# Patient Record
Sex: MALE | Race: WHITE | Employment: UNEMPLOYED | URBAN - METROPOLITAN AREA
[De-identification: names, ages, dates, MRNs, and addresses within clinical notes are randomized per-mention and may not be internally consistent; named-entity substitution may affect disease eponyms.]

---

## 2017-02-17 ENCOUNTER — OFFICE VISIT (OUTPATIENT)
Dept: FAMILY MEDICINE CLINIC | Facility: CLINIC | Age: 52
End: 2017-02-17

## 2017-02-17 VITALS
SYSTOLIC BLOOD PRESSURE: 128 MMHG | BODY MASS INDEX: 29.95 KG/M2 | HEIGHT: 73 IN | HEART RATE: 96 BPM | RESPIRATION RATE: 16 BRPM | WEIGHT: 226 LBS | DIASTOLIC BLOOD PRESSURE: 86 MMHG | TEMPERATURE: 99 F | OXYGEN SATURATION: 97 %

## 2017-02-17 DIAGNOSIS — F10.11 ALCOHOL ABUSE, IN REMISSION: ICD-10-CM

## 2017-02-17 DIAGNOSIS — L40.9 PSORIASIS: ICD-10-CM

## 2017-02-17 DIAGNOSIS — N52.9 ERECTILE DYSFUNCTION, UNSPECIFIED ERECTILE DYSFUNCTION TYPE: ICD-10-CM

## 2017-02-17 DIAGNOSIS — Z13.0 SCREENING FOR ENDOCRINE, NUTRITIONAL, METABOLIC AND IMMUNITY DISORDER: ICD-10-CM

## 2017-02-17 DIAGNOSIS — Z13.228 SCREENING FOR ENDOCRINE, NUTRITIONAL, METABOLIC AND IMMUNITY DISORDER: ICD-10-CM

## 2017-02-17 DIAGNOSIS — M25.551 RIGHT HIP PAIN: Primary | ICD-10-CM

## 2017-02-17 DIAGNOSIS — Z13.21 SCREENING FOR ENDOCRINE, NUTRITIONAL, METABOLIC AND IMMUNITY DISORDER: ICD-10-CM

## 2017-02-17 DIAGNOSIS — Z13.29 SCREENING FOR ENDOCRINE, NUTRITIONAL, METABOLIC AND IMMUNITY DISORDER: ICD-10-CM

## 2017-02-17 DIAGNOSIS — I10 ESSENTIAL HYPERTENSION: ICD-10-CM

## 2017-02-17 DIAGNOSIS — M54.50 ACUTE RIGHT-SIDED LOW BACK PAIN WITHOUT SCIATICA: ICD-10-CM

## 2017-02-17 PROCEDURE — 99204 OFFICE O/P NEW MOD 45 MIN: CPT | Performed by: EMERGENCY MEDICINE

## 2017-02-17 RX ORDER — VARDENAFIL HYDROCHLORIDE 20 MG/1
20 TABLET ORAL
COMMUNITY
End: 2017-02-17

## 2017-02-17 RX ORDER — VARDENAFIL HYDROCHLORIDE 20 MG/1
20 TABLET ORAL
Qty: 20 TABLET | Refills: 0 | Status: SHIPPED | OUTPATIENT
Start: 2017-02-17

## 2017-02-17 RX ORDER — ESCITALOPRAM OXALATE 20 MG/1
1 TABLET ORAL DAILY
Refills: 0 | COMMUNITY
Start: 2017-01-16

## 2017-02-17 RX ORDER — IBUPROFEN 800 MG/1
800 TABLET ORAL EVERY 8 HOURS PRN
Qty: 30 TABLET | Refills: 0 | Status: SHIPPED | OUTPATIENT
Start: 2017-02-17

## 2017-02-17 RX ORDER — CALCIPOTRIENE 0.05 MG/ML
SOLUTION TOPICAL
Refills: 3 | COMMUNITY
Start: 2016-12-10 | End: 2017-02-23

## 2017-02-17 RX ORDER — CALCIPOTRIENE 50 UG/G
1 OINTMENT TOPICAL AS NEEDED
Qty: 120 G | Refills: 2 | Status: SHIPPED | OUTPATIENT
Start: 2017-02-17 | End: 2017-02-23

## 2017-02-17 RX ORDER — METHYLPREDNISOLONE 4 MG/1
TABLET ORAL
Qty: 1 KIT | Refills: 0 | Status: SHIPPED | OUTPATIENT
Start: 2017-02-17 | End: 2017-03-10 | Stop reason: ALTCHOICE

## 2017-02-17 RX ORDER — BUSPIRONE HYDROCHLORIDE 5 MG/1
1 TABLET ORAL 3 TIMES DAILY
Refills: 3 | COMMUNITY
Start: 2017-01-05

## 2017-02-17 RX ORDER — TRAZODONE HYDROCHLORIDE 100 MG/1
1 TABLET ORAL
Refills: 3 | COMMUNITY
Start: 2017-01-05

## 2017-02-17 RX ORDER — CALCIPOTRIENE 50 UG/G
1 OINTMENT TOPICAL AS NEEDED
COMMUNITY
End: 2017-02-17

## 2017-02-17 RX ORDER — FLUTICASONE PROPIONATE 50 MCG
1 SPRAY, SUSPENSION (ML) NASAL DAILY
Refills: 3 | COMMUNITY
Start: 2017-01-31 | End: 2017-05-24

## 2017-02-17 NOTE — PROGRESS NOTES
Chief Complaint:   Patient presents with:  Establish Care: Pt here to discuss hip and back pain. Also discuss meds. HPI:   This is a 45year old male   Here for establishment of care    BACK AND HIP PROBLEMS  Started 6 months ago.  Numbness tingling and Ht 73\"  Wt 226 lb  BMI 29.82 kg/m2  SpO2 97% Estimated body mass index is 29.82 kg/(m^2) as calculated from the following:    Height as of this encounter: 73\". Weight as of this encounter: 226 lb. Vital signs reviewed. Appears stated age, well groom Tablet Therapy Pack; As directed. -     ibuprofen 800 MG Oral Tab; Take 1 tablet (800 mg total) by mouth every 8 (eight) hours as needed for Pain.  -     XR LUMBAR SPINE (MIN 4 VIEWS) (CPT=72110);  Future    Screening for endocrine, nutritional, metabolic

## 2017-02-17 NOTE — PATIENT INSTRUCTIONS
Thank you for choosing [facility]  To Do:  [address]  1. Have Xrays done of hip and back  2. Have blood tests done  3. Follow up in 2 weeks for annual physical  4. Take Medrol dose pack as directed.  After finishing medrol dose pack may pain, unless your healthcare provider prescribed other pain medicine.  If you have chronic conditions like diabetes, liver or kidney disease, stomach ulcers, or gastrointestinal bleeding, or are taking blood thinners, talk with your healthcare provider befo minutes.   Sleeping  The best way to sleep is on your side with your knees bent. Put a low pillow under your head to support your neck in a neutral spine position. Avoid thick pillows that bend your neck to one side.  Put a pillow between your legs to furth

## 2017-02-20 ENCOUNTER — TELEPHONE (OUTPATIENT)
Dept: FAMILY MEDICINE CLINIC | Facility: CLINIC | Age: 52
End: 2017-02-20

## 2017-02-20 DIAGNOSIS — L40.9 PSORIASIS: Primary | ICD-10-CM

## 2017-02-21 NOTE — TELEPHONE ENCOUNTER
Called pharmacy and got a patient IDL [id number]. Called [facility] and spoke with 3 different people. Finally spoke with Errol Davison who advised that we call [phone] for PA. She did connect the call. Spoke with [facility]. PA was completed for patient.   Patient is

## 2017-02-22 NOTE — TELEPHONE ENCOUNTER
PA notification received via fax for patient. Patient Calcipotriene ointment was denied. Patient needs to use one of the following preferred medications Calcipotriene cream or solution. Please advise. Thank you! PA denial sent to scanning.

## 2017-02-23 RX ORDER — CALCIPOTRIENE 0.05 MG/ML
SOLUTION TOPICAL
Qty: 60 ML | Refills: 3 | Status: SHIPPED | OUTPATIENT
Start: 2017-02-23

## 2017-02-23 NOTE — TELEPHONE ENCOUNTER
I called the number below as I did not have a number provided for [facility]. Per representative at the number below, [facility] did not call regarding PA status.  As PA is already completed and has been denied, I will wait for Dr. Ed Erwin to determine if juve

## 2017-02-24 ENCOUNTER — HOSPITAL ENCOUNTER (OUTPATIENT)
Dept: GENERAL RADIOLOGY | Age: 52
Discharge: HOME | End: 2017-02-24
Attending: EMERGENCY MEDICINE
Payer: MEDICAID

## 2017-02-24 ENCOUNTER — LAB ENCOUNTER (OUTPATIENT)
Dept: LAB | Age: 52
End: 2017-02-24
Attending: EMERGENCY MEDICINE
Payer: MEDICAID

## 2017-02-24 DIAGNOSIS — M54.50 ACUTE RIGHT-SIDED LOW BACK PAIN WITHOUT SCIATICA: ICD-10-CM

## 2017-02-24 DIAGNOSIS — M25.551 RIGHT HIP PAIN: ICD-10-CM

## 2017-02-24 DIAGNOSIS — Z13.21 SCREENING FOR ENDOCRINE, NUTRITIONAL, METABOLIC AND IMMUNITY DISORDER: ICD-10-CM

## 2017-02-24 DIAGNOSIS — Z13.228 SCREENING FOR ENDOCRINE, NUTRITIONAL, METABOLIC AND IMMUNITY DISORDER: ICD-10-CM

## 2017-02-24 DIAGNOSIS — F10.11 ALCOHOL ABUSE, IN REMISSION: ICD-10-CM

## 2017-02-24 DIAGNOSIS — Z13.29 SCREENING FOR ENDOCRINE, NUTRITIONAL, METABOLIC AND IMMUNITY DISORDER: ICD-10-CM

## 2017-02-24 DIAGNOSIS — Z13.0 SCREENING FOR ENDOCRINE, NUTRITIONAL, METABOLIC AND IMMUNITY DISORDER: ICD-10-CM

## 2017-02-24 LAB
ALBUMIN SERPL-MCNC: 4.1 G/DL (ref 3.5–4.8)
ALP LIVER SERPL-CCNC: 54 U/L (ref 45–117)
ALT SERPL-CCNC: 26 U/L (ref 17–63)
AST SERPL-CCNC: 21 U/L (ref 15–41)
BASOPHILS # BLD AUTO: 0.08 X10(3) UL (ref 0–0.1)
BASOPHILS NFR BLD AUTO: 1.1 %
BILIRUB SERPL-MCNC: 0.4 MG/DL (ref 0.1–2)
BUN BLD-MCNC: 15 MG/DL (ref 8–20)
CALCIUM BLD-MCNC: 9.1 MG/DL (ref 8.3–10.3)
CHLORIDE: 104 MMOL/L (ref 101–111)
CHOLEST SMN-MCNC: 166 MG/DL (ref ?–200)
CO2: 27 MMOL/L (ref 22–32)
CREAT BLD-MCNC: 0.81 MG/DL (ref 0.7–1.3)
EOSINOPHIL # BLD AUTO: 0.11 X10(3) UL (ref 0–0.3)
EOSINOPHIL NFR BLD AUTO: 1.6 %
ERYTHROCYTE [DISTWIDTH] IN BLOOD BY AUTOMATED COUNT: 13.5 % (ref 11.5–16)
GLUCOSE BLD-MCNC: 80 MG/DL (ref 70–99)
HCT VFR BLD AUTO: 49.1 % (ref 37–53)
HDLC SERPL-MCNC: 47 MG/DL (ref 45–?)
HDLC SERPL: 3.53 {RATIO} (ref ?–4.97)
HGB BLD-MCNC: 16.7 G/DL (ref 13–17)
IMMATURE GRANULOCYTE COUNT: 0.04 X10(3) UL (ref 0–1)
IMMATURE GRANULOCYTE RATIO %: 0.6 %
LDLC SERPL CALC-MCNC: 92 MG/DL (ref ?–130)
LYMPHOCYTES # BLD AUTO: 2.12 X10(3) UL (ref 0.9–4)
LYMPHOCYTES NFR BLD AUTO: 29.9 %
M PROTEIN MFR SERPL ELPH: 7.3 G/DL (ref 6.1–8.3)
MCH RBC QN AUTO: 32.8 PG (ref 27–33.2)
MCHC RBC AUTO-ENTMCNC: 34 G/DL (ref 31–37)
MCV RBC AUTO: 96.5 FL (ref 80–99)
MONOCYTES # BLD AUTO: 0.77 X10(3) UL (ref 0.1–0.6)
MONOCYTES NFR BLD AUTO: 10.9 %
NEUTROPHIL ABS PRELIM: 3.97 X10 (3) UL (ref 1.3–6.7)
NEUTROPHILS # BLD AUTO: 3.97 X10(3) UL (ref 1.3–6.7)
NEUTROPHILS NFR BLD AUTO: 55.9 %
NONHDLC SERPL-MCNC: 119 MG/DL (ref ?–130)
PLATELET # BLD AUTO: 171 10(3)UL (ref 150–450)
POTASSIUM SERPL-SCNC: 4.3 MMOL/L (ref 3.6–5.1)
RBC # BLD AUTO: 5.09 X10(6)UL (ref 4.3–5.7)
RED CELL DISTRIBUTION WIDTH-SD: 48.5 FL (ref 35.1–46.3)
SODIUM SERPL-SCNC: 137 MMOL/L (ref 136–144)
TRIGLYCERIDES: 134 MG/DL (ref ?–150)
TSI SER-ACNC: 1.6 MIU/ML (ref 0.35–5.5)
VLDL: 27 MG/DL (ref 5–40)
WBC # BLD AUTO: 7.1 X10(3) UL (ref 4–13)

## 2017-02-24 PROCEDURE — 84443 ASSAY THYROID STIM HORMONE: CPT

## 2017-02-24 PROCEDURE — 72110 X-RAY EXAM L-2 SPINE 4/>VWS: CPT

## 2017-02-24 PROCEDURE — 73502 X-RAY EXAM HIP UNI 2-3 VIEWS: CPT

## 2017-02-24 PROCEDURE — 36415 COLL VENOUS BLD VENIPUNCTURE: CPT

## 2017-02-24 PROCEDURE — 80053 COMPREHEN METABOLIC PANEL: CPT

## 2017-02-24 PROCEDURE — 80061 LIPID PANEL: CPT

## 2017-02-24 PROCEDURE — 85025 COMPLETE CBC W/AUTO DIFF WBC: CPT

## 2017-02-24 NOTE — TELEPHONE ENCOUNTER
I spoke to patient. Advised solution sent to pharmacy instead. He states understanding. States no further questions.

## 2017-02-27 ENCOUNTER — TELEPHONE (OUTPATIENT)
Dept: FAMILY MEDICINE CLINIC | Facility: CLINIC | Age: 52
End: 2017-02-27

## 2017-02-27 NOTE — TELEPHONE ENCOUNTER
Pt notified of Xray, lab results & below orders. Pt has physical appt made already. All questions answered, pt expresses understanding.

## 2017-02-27 NOTE — TELEPHONE ENCOUNTER
----- Message from Darcy Paul MD sent at 2/26/2017  9:40 PM CST -----  Unremarkable labs  Pt to follow up for annual exam

## 2017-03-06 ENCOUNTER — TELEPHONE (OUTPATIENT)
Dept: FAMILY MEDICINE CLINIC | Facility: CLINIC | Age: 52
End: 2017-03-06

## 2017-03-06 NOTE — TELEPHONE ENCOUNTER
See 2/20 encounter. PA fax faxed back to [facility],  stating PA was denied & Rx changed to Solution on 2/23.

## 2017-03-10 ENCOUNTER — OFFICE VISIT (OUTPATIENT)
Dept: FAMILY MEDICINE CLINIC | Facility: CLINIC | Age: 52
End: 2017-03-10

## 2017-03-10 ENCOUNTER — APPOINTMENT (OUTPATIENT)
Dept: LAB | Age: 52
End: 2017-03-10
Attending: EMERGENCY MEDICINE
Payer: MEDICAID

## 2017-03-10 VITALS
SYSTOLIC BLOOD PRESSURE: 132 MMHG | BODY MASS INDEX: 30 KG/M2 | RESPIRATION RATE: 16 BRPM | OXYGEN SATURATION: 98 % | DIASTOLIC BLOOD PRESSURE: 88 MMHG | TEMPERATURE: 98 F | WEIGHT: 224 LBS | HEART RATE: 77 BPM

## 2017-03-10 DIAGNOSIS — Z00.00 ENCOUNTER FOR ANNUAL PHYSICAL EXAM: Primary | ICD-10-CM

## 2017-03-10 DIAGNOSIS — Z12.11 SCREENING FOR COLON CANCER: ICD-10-CM

## 2017-03-10 DIAGNOSIS — F17.200 SMOKER: ICD-10-CM

## 2017-03-10 DIAGNOSIS — M54.50 CHRONIC BILATERAL LOW BACK PAIN WITHOUT SCIATICA: ICD-10-CM

## 2017-03-10 DIAGNOSIS — G89.29 CHRONIC BILATERAL LOW BACK PAIN WITHOUT SCIATICA: ICD-10-CM

## 2017-03-10 DIAGNOSIS — M26.629 TMJ SYNDROME: ICD-10-CM

## 2017-03-10 DIAGNOSIS — G57.11 MERALGIA PARESTHETICA OF RIGHT SIDE: ICD-10-CM

## 2017-03-10 DIAGNOSIS — Z12.5 PROSTATE CANCER SCREENING: ICD-10-CM

## 2017-03-10 DIAGNOSIS — F10.11 HISTORY OF ALCOHOL ABUSE: ICD-10-CM

## 2017-03-10 LAB — COMPLEXED PSA SERPL-MCNC: 0.85 NG/ML (ref 0.01–4)

## 2017-03-10 PROCEDURE — 36415 COLL VENOUS BLD VENIPUNCTURE: CPT

## 2017-03-10 PROCEDURE — 99396 PREV VISIT EST AGE 40-64: CPT | Performed by: EMERGENCY MEDICINE

## 2017-03-10 NOTE — PATIENT INSTRUCTIONS
Thank you for choosing [facility]  To Do:  FOR Olman Taveras JR  1. COntinue with Ibuprofen 800 for low back pain  2. Follow up with Neurology for right hip pain, Dr. Vitor Mi  3. Continue Follow up with psychiatry   4.  Follow up yearly or as n

## 2017-03-10 NOTE — PROGRESS NOTES
Chief Complaint:   Patient presents with:  Physical: Pt here physical and discuss labs. Pt would also like to discuss LT sided jaw pain and hip/back pain. HPI:   This is a 45year old male who present for a yearly annual exam    WELL-MALE EXAM     1. Grains and   starches Moderate                      Vegetables  some                   Dairy some                  Meats A lot                  Sweets   few    b. Exercise:  NO    c. Do you always wear seat belts? YES    d.  If over 27years old, Surgical History    GASTRO - INTERNAL       Social History:    Smoking Status: Current Every Day Smoker        Packs/Day: 0.00  Years:         Smokeless Status: Never Used                        Alcohol Use: No              Family History:  Family History Vital signs reviewed. Appears stated age, well groomed.   GENERAL: well developed, well nourished, well hydrated, no distress  SKIN: good skin turgor, no obvious rashes  HEENT: atraumatic, normocephalic, ears, nose and throat are clear  EYES: sclera non PSA (Screening) [E]; Future    TMJ syndrome  -     Neuro Referral - In Network    Meralgia paresthetica of right side  -     Neuro Referral - In Network    Chronic bilateral low back pain without sciatica    History of alcohol abuse   Stable.  Has not had a

## 2017-03-23 ENCOUNTER — OFFICE VISIT (OUTPATIENT)
Dept: SURGERY | Facility: CLINIC | Age: 52
End: 2017-03-23

## 2017-03-23 VITALS
BODY MASS INDEX: 29.82 KG/M2 | RESPIRATION RATE: 16 BRPM | WEIGHT: 225 LBS | SYSTOLIC BLOOD PRESSURE: 125 MMHG | TEMPERATURE: 98 F | HEART RATE: 64 BPM | DIASTOLIC BLOOD PRESSURE: 84 MMHG | HEIGHT: 73 IN

## 2017-03-23 DIAGNOSIS — Z12.11 ENCOUNTER FOR SCREENING COLONOSCOPY: Primary | ICD-10-CM

## 2017-03-23 RX ORDER — POLYETHYLENE GLYCOL 3350, SODIUM CHLORIDE, SODIUM BICARBONATE, POTASSIUM CHLORIDE 420; 11.2; 5.72; 1.48 G/4L; G/4L; G/4L; G/4L
POWDER, FOR SOLUTION ORAL
Qty: 1 BOTTLE | Refills: 0 | Status: SHIPPED | OUTPATIENT
Start: 2017-03-23

## 2017-03-23 NOTE — H&P
New Patient Visit Note       Active Problems      1. Encounter for screening colonoscopy        Chief Complaint   Patient presents with:  Colonoscopy: New patient referred by Dr. Anrel Hsieh for colonoscopy consult.        History of Present Illness     The pa Status: Never Used                        Alcohol Use: No              Drug Use: No                 Current Outpatient Prescriptions:  PEG 3350-KCl-Na Bicarb-NaCl (TRILYTE) 420 g Oral Recon Soln Starting at 4:00 pm the night before procedure, drink 8 ounce syncope and weakness. Hematological: Negative for adenopathy. Does not bruise/bleed easily. Psychiatric/Behavioral: Negative for behavioral problems and sleep disturbance.        Physical Findings   /84 mmHg  Pulse 64  Temp(Src) 97.6 °F (36.4 °C) the patient. The risks explained include, but are not limited to, bleeding, infection, perforation, nondiagnostic heel, incomplete exam, missed lesions, and the possibile need for further therapeutic, diagnostic, or surgical intervention.   The patient voi

## 2017-05-24 DIAGNOSIS — I10 ESSENTIAL HYPERTENSION: Primary | ICD-10-CM

## 2017-05-24 NOTE — TELEPHONE ENCOUNTER
LOV: 3/10/17  We have never filled for patient before. Please approve or deny pending Rx. Thank you!

## 2017-05-25 RX ORDER — ESCITALOPRAM OXALATE 20 MG/1
20 TABLET ORAL DAILY
Refills: 0 | OUTPATIENT
Start: 2017-05-25

## 2017-05-25 RX ORDER — FLUTICASONE PROPIONATE 50 MCG
2 SPRAY, SUSPENSION (ML) NASAL DAILY
Qty: 1 BOTTLE | Refills: 3 | Status: SHIPPED | OUTPATIENT
Start: 2017-05-25

## 2017-05-25 RX ORDER — TRAZODONE HYDROCHLORIDE 100 MG/1
100 TABLET ORAL
Refills: 3 | OUTPATIENT
Start: 2017-05-25

## 2017-05-25 RX ORDER — BUSPIRONE HYDROCHLORIDE 5 MG/1
5 TABLET ORAL 3 TIMES DAILY
Refills: 3 | OUTPATIENT
Start: 2017-05-25

## 2017-05-25 NOTE — TELEPHONE ENCOUNTER
Please inform patient that we are unable to refill buspirone, trazodone and citalopram.  Patient follows up with psychiatry and should be refilled by his psychiatrist Dr. Mina Vivas    Other prescriptions for metoprolol and Flonase are refilled

## 2018-01-25 ENCOUNTER — TELEPHONE (OUTPATIENT)
Dept: FAMILY MEDICINE CLINIC | Facility: CLINIC | Age: 53
End: 2018-01-25

## 2019-02-04 ENCOUNTER — PATIENT OUTREACH (OUTPATIENT)
Dept: FAMILY MEDICINE CLINIC | Facility: CLINIC | Age: 54
End: 2019-02-04

## (undated) NOTE — Clinical Note
2017    Patient: Beatriz Mohan  : [id number] Visit date: 3/23/2017    Dear  Dr. Cara Rae MD,    Thank you for referring Beatriz Mohan to my practice. Please find my assessment and plan below.            Assessment   Encounter for margaux

## (undated) NOTE — MR AVS SNAPSHOT
[address]  [address]  [address] [id number] [phone]               Thank you for choosing us for your health care visit with Arabella Valles MD.  We are glad to serve you and happy to provide you with this summary of authorization, such as [place], please feel free to schedule your appointment immediately. However, if you are unsure about the requirements for authorization, please wait 5-7 days and then contact your physician's office.  At that time, you will 2. Follow up with Neurology for right hip pain, Dr. Janelle Conner  3. Continue Follow up with psychiatry   4. Follow up yearly or as needed  5. Send old records  6. Have PSA done  7. Arrange for screening colonoscopy, Dr. Zaid Saldaña  8.  Retreive old records of en Dysfunction. Commonly known as:  LEVITRA                   MyChart     Sign up for [employer], your secure online medical record. [employer] will allow you to access patient instructions from your recent visit,  view other health information, and more.  To sig

## (undated) NOTE — MR AVS SNAPSHOT
[address]  [address]  [address] [id number]  [phone]               Thank you for choosing us for your health care visit with Willis Diaz MD.  We are glad to serve you and happy to provide you with this summary of prolonged sitting. This puts more stress on the lower back than standing or walking. · Wear quality shoes with sufficient arch support. Foot and ankle alignment can affect back symptoms. Women should avoid wearing high heels.   · Therapeutic massage can he · Relax and repeat the exercise with your right knee. · Do 10 of these exercises for each leg.   Safe lifting method  · Don’t bend over at the waist to lift an object off the floor.  Instead, bend your knees and hips in a squat.   · Keep your back and head When to seek medical care  Call your healthcare provider if any of the following occur:  · Pain becomes worse or spreads to your arms or legs  · Weakness or numbness in one or both arms or legs  · Numbness in the groin area  Date Last Reviewed: 6/1/2016  © * Notice: This list has 2 medication(s) that are the same as other medications prescribed for you. Read the directions carefully, and ask your doctor or other care provider to review them with you.          Where to Get Your Medications      These medicat Assoc Dx:  Acute right-sided low back pain without sciatica [M54.5]                 Scheduling Instructions     Friday February 17, 2017     Imaging:  XR HIP W OR WO PELVIS 2 OR 3 VIEWS, RIGHT (CPT=73502)    Instructions:   To schedule an appointment for y drinks, candies and desserts   Eat plenty of low-fat dairy products High fat meats and dairy   Choose whole grain products Foods high in sodium   Water is best for hydration Fast food.    Eat at home when possible     Tips for increasing your physical activ